# Patient Record
Sex: FEMALE | Race: WHITE
[De-identification: names, ages, dates, MRNs, and addresses within clinical notes are randomized per-mention and may not be internally consistent; named-entity substitution may affect disease eponyms.]

---

## 2019-11-20 ENCOUNTER — HOSPITAL ENCOUNTER (EMERGENCY)
Dept: HOSPITAL 95 - ER | Age: 54
Discharge: HOME | End: 2019-11-20
Payer: COMMERCIAL

## 2019-11-20 VITALS — BODY MASS INDEX: 32.25 KG/M2 | HEIGHT: 63 IN | WEIGHT: 181.99 LBS

## 2019-11-20 DIAGNOSIS — S82.302A: Primary | ICD-10-CM

## 2019-11-20 DIAGNOSIS — Z79.899: ICD-10-CM

## 2019-11-20 DIAGNOSIS — Z91.040: ICD-10-CM

## 2019-11-20 DIAGNOSIS — Z88.5: ICD-10-CM

## 2019-12-12 ENCOUNTER — HOSPITAL ENCOUNTER (OUTPATIENT)
Dept: HOSPITAL 95 - ORSCMMR | Age: 54
Discharge: HOME | End: 2019-12-12
Attending: ORTHOPAEDIC SURGERY
Payer: COMMERCIAL

## 2019-12-12 VITALS — HEIGHT: 62.99 IN | WEIGHT: 178.79 LBS | BODY MASS INDEX: 31.68 KG/M2

## 2019-12-12 DIAGNOSIS — I10: ICD-10-CM

## 2019-12-12 DIAGNOSIS — F32.9: ICD-10-CM

## 2019-12-12 DIAGNOSIS — K21.9: ICD-10-CM

## 2019-12-12 DIAGNOSIS — E03.9: ICD-10-CM

## 2019-12-12 DIAGNOSIS — S82.851A: Primary | ICD-10-CM

## 2019-12-12 DIAGNOSIS — Z79.899: ICD-10-CM

## 2019-12-12 PROCEDURE — C1713 ANCHOR/SCREW BN/BN,TIS/BN: HCPCS

## 2019-12-12 PROCEDURE — C1769 GUIDE WIRE: HCPCS

## 2019-12-12 PROCEDURE — 0QSG04Z REPOSITION RIGHT TIBIA WITH INTERNAL FIXATION DEVICE, OPEN APPROACH: ICD-10-PCS | Performed by: ORTHOPAEDIC SURGERY

## 2019-12-12 PROCEDURE — 0QSJ04Z REPOSITION RIGHT FIBULA WITH INTERNAL FIXATION DEVICE, OPEN APPROACH: ICD-10-PCS | Performed by: ORTHOPAEDIC SURGERY

## 2019-12-12 NOTE — NUR
1640
NORCO GIVEN PO AS SHE IS TAKING PO WITHOUT PROBLEM
CONTINUES TO TALK TO SUNNY GRAY ABOUT DIFFERENT LIFE SITUATIONS
ICE PACK TO OPERTIVE SITE
WIGGLES TOE
CAP REFILL WNL

## 2019-12-12 NOTE — NUR
PT INTO SDS VIA W/C.
History, Chart, Medications and Allergies reviewed before start of
procedure.Patient confirms NPO status and agrees with scheduled surgery. PT
UPSET AT FIRST, BUT CALMS AFTER SOME DISCUSSION. DENIES ACTUAL ALLERGY TO
HYDROCODONE, REPORTS MAKES HER NAUSEATED. STATES SHE DISCUSSED THIS WITH DR. ROSA PREOPERATIVLY AND IS PLANNING ON TAKING HYPROCODONE POST OPERATIVLY FOR
PAIN CONTROL JUST AS NEEDED.

## 2019-12-12 NOTE — NUR
6077
PT DECLINES TAKING PO MED AT THIS TIME SHE WANTS TO WAIT TO MAKE SURE SHE IS
NOT GOING TO HAVE AN UPSET STOMACH. SHE IS EATING [employer] AND DRINKING
SPRITE.

## 2019-12-12 NOTE — NUR
PT IS VERY VERBAL ABOUT HER FALL AND HER MOVE AND MONEY PROBLEMS MOLD PROBLEMS
ETC I HAVE ENC HER TO TRY TO REST

## 2019-12-12 NOTE — NUR
PT DRESSED SELF. STAND BY ASSIST TO TRANSFER TO W/C FOR DISCHARGE. SHE  WAS
MEDICATED PER  ORDERS FOR PAIN . VERBALIZED D/C INSTRUCTIONS . FRIEND AT
BEDSIDE TO TAKE PT HOME  GLASSES AND PURSE WITH PATIENT

## 2022-05-16 ENCOUNTER — HOSPITAL ENCOUNTER (EMERGENCY)
Dept: HOSPITAL 95 - ER | Age: 57
Discharge: HOME | End: 2022-05-16
Payer: COMMERCIAL

## 2022-05-16 VITALS — WEIGHT: 184 LBS | HEIGHT: 63 IN | BODY MASS INDEX: 32.6 KG/M2

## 2022-05-16 DIAGNOSIS — Z88.6: ICD-10-CM

## 2022-05-16 DIAGNOSIS — Z88.5: ICD-10-CM

## 2022-05-16 DIAGNOSIS — Z91.040: ICD-10-CM

## 2022-05-16 DIAGNOSIS — Z79.899: ICD-10-CM

## 2022-05-16 DIAGNOSIS — M54.31: Primary | ICD-10-CM

## 2022-05-26 ENCOUNTER — HOSPITAL ENCOUNTER (EMERGENCY)
Dept: HOSPITAL 95 - ER | Age: 57
LOS: 1 days | Discharge: HOME | End: 2022-05-27
Payer: COMMERCIAL

## 2022-05-26 VITALS — BODY MASS INDEX: 32.6 KG/M2 | WEIGHT: 184 LBS | HEIGHT: 63 IN

## 2022-05-26 DIAGNOSIS — Z91.040: ICD-10-CM

## 2022-05-26 DIAGNOSIS — Z88.5: ICD-10-CM

## 2022-05-26 DIAGNOSIS — I10: ICD-10-CM

## 2022-05-26 DIAGNOSIS — Z79.899: ICD-10-CM

## 2022-05-26 DIAGNOSIS — K21.9: ICD-10-CM

## 2022-05-26 DIAGNOSIS — Z88.8: ICD-10-CM

## 2022-05-26 DIAGNOSIS — K52.9: Primary | ICD-10-CM

## 2022-05-26 LAB
ALBUMIN SERPL BCP-MCNC: 3.4 G/DL (ref 3.4–5)
ALBUMIN/GLOB SERPL: 0.9 {RATIO} (ref 0.8–1.8)
ALT SERPL W P-5'-P-CCNC: 21 U/L (ref 12–78)
ANION GAP SERPL CALCULATED.4IONS-SCNC: 7 MMOL/L (ref 6–16)
AST SERPL W P-5'-P-CCNC: 13 U/L (ref 12–37)
BASOPHILS # BLD AUTO: 0.03 K/MM3 (ref 0–0.23)
BASOPHILS NFR BLD AUTO: 0 % (ref 0–2)
BILIRUB SERPL-MCNC: 0.3 MG/DL (ref 0.1–1)
BUN SERPL-MCNC: 19 MG/DL (ref 8–24)
CALCIUM SERPL-MCNC: 9 MG/DL (ref 8.5–10.1)
CHLORIDE SERPL-SCNC: 105 MMOL/L (ref 98–108)
CO2 SERPL-SCNC: 27 MMOL/L (ref 21–32)
CREAT SERPL-MCNC: 0.84 MG/DL (ref 0.4–1)
DEPRECATED RDW RBC AUTO: 46.6 FL (ref 35.1–46.3)
EOSINOPHIL # BLD AUTO: 0.18 K/MM3 (ref 0–0.68)
EOSINOPHIL NFR BLD AUTO: 1 % (ref 0–6)
ERYTHROCYTE [DISTWIDTH] IN BLOOD BY AUTOMATED COUNT: 14.6 % (ref 11.7–14.2)
GLOBULIN SER CALC-MCNC: 3.6 G/DL (ref 2.2–4)
GLUCOSE SERPL-MCNC: 116 MG/DL (ref 70–99)
HCT VFR BLD AUTO: 41.1 % (ref 33–51)
HGB BLD-MCNC: 13.4 G/DL (ref 11.5–16)
IMM GRANULOCYTES # BLD AUTO: 0.06 K/MM3 (ref 0–0.1)
IMM GRANULOCYTES NFR BLD AUTO: 0 % (ref 0–1)
LYMPHOCYTES # BLD AUTO: 1.57 K/MM3 (ref 0.84–5.2)
LYMPHOCYTES NFR BLD AUTO: 11 % (ref 21–46)
MCHC RBC AUTO-ENTMCNC: 32.6 G/DL (ref 31.5–36.5)
MCV RBC AUTO: 87 FL (ref 80–100)
MONOCYTES # BLD AUTO: 1.09 K/MM3 (ref 0.16–1.47)
MONOCYTES NFR BLD AUTO: 8 % (ref 4–13)
NEUTROPHILS # BLD AUTO: 10.92 K/MM3 (ref 1.96–9.15)
NEUTROPHILS NFR BLD AUTO: 79 % (ref 41–73)
NRBC # BLD AUTO: 0 K/MM3 (ref 0–0.02)
NRBC BLD AUTO-RTO: 0 /100 WBC (ref 0–0.2)
PLATELET # BLD AUTO: 350 K/MM3 (ref 150–400)
POTASSIUM SERPL-SCNC: 3.7 MMOL/L (ref 3.5–5.5)
PROT SERPL-MCNC: 7 G/DL (ref 6.4–8.2)
SODIUM SERPL-SCNC: 139 MMOL/L (ref 136–145)

## 2022-05-26 PROCEDURE — A9270 NON-COVERED ITEM OR SERVICE: HCPCS
